# Patient Record
Sex: FEMALE
[De-identification: names, ages, dates, MRNs, and addresses within clinical notes are randomized per-mention and may not be internally consistent; named-entity substitution may affect disease eponyms.]

---

## 2022-07-06 LAB
HCT VFR BLD CALC: 42.5 % (ref 36–45)
LYMPHOCYTES # SPEC AUTO: 2.3 K/UL (ref 0.7–4.9)
MCV RBC: 99 FL (ref 80–100)
PMV BLD: 8.4 FL (ref 7.6–11.3)
RBC # BLD: 4.29 M/UL (ref 3.86–4.86)

## 2022-07-19 ENCOUNTER — HOSPITAL ENCOUNTER (OUTPATIENT)
Dept: HOSPITAL 97 - OR | Age: 52
Discharge: HOME | End: 2022-07-19
Attending: OBSTETRICS & GYNECOLOGY
Payer: COMMERCIAL

## 2022-07-19 VITALS — DIASTOLIC BLOOD PRESSURE: 75 MMHG | OXYGEN SATURATION: 99 % | SYSTOLIC BLOOD PRESSURE: 130 MMHG | TEMPERATURE: 96.2 F

## 2022-07-19 DIAGNOSIS — R10.2: ICD-10-CM

## 2022-07-19 DIAGNOSIS — N39.3: ICD-10-CM

## 2022-07-19 DIAGNOSIS — N83.12: Primary | ICD-10-CM

## 2022-07-19 DIAGNOSIS — Z20.822: ICD-10-CM

## 2022-07-19 PROCEDURE — 36415 COLL VENOUS BLD VENIPUNCTURE: CPT

## 2022-07-19 PROCEDURE — 86901 BLOOD TYPING SEROLOGIC RH(D): CPT

## 2022-07-19 PROCEDURE — 58661 LAPAROSCOPY REMOVE ADNEXA: CPT

## 2022-07-19 PROCEDURE — 0TVD8ZZ RESTRICTION OF URETHRA, VIA NATURAL OR ARTIFICIAL OPENING ENDOSCOPIC: ICD-10-PCS

## 2022-07-19 PROCEDURE — 51715 ENDOSCOPIC INJECTION/IMPLANT: CPT

## 2022-07-19 PROCEDURE — 81003 URINALYSIS AUTO W/O SCOPE: CPT

## 2022-07-19 PROCEDURE — 58662 LAPAROSCOPY EXCISE LESIONS: CPT

## 2022-07-19 PROCEDURE — 87811 SARS-COV-2 COVID19 W/OPTIC: CPT

## 2022-07-19 PROCEDURE — 85025 COMPLETE CBC W/AUTO DIFF WBC: CPT

## 2022-07-19 PROCEDURE — 0DBW4ZZ EXCISION OF PERITONEUM, PERCUTANEOUS ENDOSCOPIC APPROACH: ICD-10-PCS

## 2022-07-19 PROCEDURE — 0UT24ZZ RESECTION OF BILATERAL OVARIES, PERCUTANEOUS ENDOSCOPIC APPROACH: ICD-10-PCS

## 2022-07-19 PROCEDURE — 86900 BLOOD TYPING SEROLOGIC ABO: CPT

## 2022-07-19 PROCEDURE — 88305 TISSUE EXAM BY PATHOLOGIST: CPT

## 2022-07-19 PROCEDURE — 86850 RBC ANTIBODY SCREEN: CPT

## 2022-07-19 RX ADMIN — BUPIVACAINE HYDROCHLORIDE ONE ML: 2.5 INJECTION, SOLUTION EPIDURAL; INFILTRATION; INTRACAUDAL at 08:17

## 2022-07-19 RX ADMIN — HYDROMORPHONE HYDROCHLORIDE ONE MG: 1 INJECTION, SOLUTION INTRAMUSCULAR; INTRAVENOUS; SUBCUTANEOUS at 09:51

## 2022-07-19 RX ADMIN — HYDROMORPHONE HYDROCHLORIDE ONE MG: 1 INJECTION, SOLUTION INTRAMUSCULAR; INTRAVENOUS; SUBCUTANEOUS at 09:46

## 2022-07-19 RX ADMIN — BUPIVACAINE HYDROCHLORIDE ONE ML: 2.5 INJECTION, SOLUTION EPIDURAL; INFILTRATION; INTRACAUDAL at 07:46

## 2022-07-19 RX ADMIN — SODIUM CHLORIDE, SODIUM LACTATE, POTASSIUM CHLORIDE, AND CALCIUM CHLORIDE ONE MLS: .6; .31; .03; .02 INJECTION, SOLUTION INTRAVENOUS at 09:00

## 2022-07-19 RX ADMIN — SODIUM CHLORIDE, SODIUM LACTATE, POTASSIUM CHLORIDE, AND CALCIUM CHLORIDE ONE MLS: .6; .31; .03; .02 INJECTION, SOLUTION INTRAVENOUS at 08:57

## 2023-05-04 NOTE — OP
Date of Procedure:  07/19/2022



Surgeon:  Gem Chavez MD



Assistant:  Meena Leal.



Preoperative Diagnoses:  Pelvic pain and stress urinary incontinence.



Postoperative Diagnoses:  Pelvic pain and stress urinary incontinence, lesion suspicious for endometr
iosis, right pelvic peritoneum.



Procedures Performed:  Diagnostic laparoscopy, bilateral oophorectomy, then fulguration of peritoneal
 lesions on the right pelvic sidewall suspicious for endometriosis, urethral bulking with Bulkamid, a
nd cystoscopy.



Estimated Blood Loss:  Minimal.



Specimens:  Bilateral ovaries.



Complications:  No complications.



Drains:  No drains.



Condition:  The patient's condition is stable.



Findings:  The left ovary had cystic lesion on it, most likely a physiologic cyst.  No other lesions 
seen around.  On the right side, the ovary was completely unremarkable at the ovarian pedicle.  Tubes
 were not seen on either side.  They have been likely removed with the hysterectomy. 



There were peritoneal implants and calcifications along the right pelvic sidewall more than on the le
ft side and with suspicion that some of these could be endometriosis, fulguration with bipolar was do
ne.  On the left side, very few calcifications, but these were clearly postoperative changes. 



Appendix was visualized and unremarkable. 



Cystoscopy with bulking was performed with 1 mL of the Bulkamid gel. 



After informed consent was verified, the patient was taken back to the OR, placed in supine fashion o
n the operating table.  General anesthesia was given.  She was consented for a right oophorectomy as 
her pain was on the right side.  Also discussed that there is possibility that if there is no other c
ontributing factors, maybe it is best at age 52 to have a bilateral salpingo-oophorectomy, which woul
d be protective for no other ovarian lesions, but also for __________ from pain.  Once this was discu
ssed and the patient consented for this, then she was taken back.



Procedure In Detail:  She was placed in supine fashion on the operating table.  General anesthesia wa
s given.  She was placed in dorsal lithotomy position using Onesimo stirrups.  Abdomen, vulva, vagina, 
perineum, and medial thighs were prepped and draped in a sterile fashion. 



Sponge on stick was placed for vaginal retraction and a Stallworth was placed for drainage. 



1 cm infraumbilical incision made with a scalpel using the open laparoscopy technique.  Fascia was in
cised, tagged with 0 Vicryl sutures.  Peritoneum entered sharply.  S-retractors were placed, Saul i
ntroduced, and site of entry was checked and was unremarkable on upper abdominal surfaces, lower abdo
jayden surfaces.  On the anterior abdominal peritoneum, no lesions were seen. 



The 5 suprapubic and left lower quadrant ports were placed after injecting with local at fascia and s
kin.  Without any problems, then thorough inspection of the pelvic cavity was conducted and refer to 
the findings above.  The right ovary was nicely isolated on its pedicle, mostly hanging down towards 
the abdominal side.  This was picked up and the area round it was inspected for any endometriosis.  T
here were no suspicious lesions.  The right ovary was taken down with the help of LigaSure from the o
varian pedicle.  The specimen was placed in the cul-de-sac. 



I turned to the left side and the ovary was  from the lateral wall and isolated on the pedic
le.  Ureters were identified on both sides and were not close to this area.  Then, LigaSure was used 
to take down the IP ligament and ovary, had specimen placed in the cul-de-sac. 



Then, the peritoneal implants were picked up with the help of a grasper and cauterized with bipolar u
sing the LigaSure current without vessel sealing.  All on the right side from the round ligament area
 between the ureter and the bladder, this area was cauterized as well, making sure that no underlying
 bladder or ureter were present here, at least 5-6 places were fulgurated. 



No evidence of any other peritoneal implants in the posterior cul-de-sac or the bladder.  The left si
de was not clear for endometriosis. 



Thorough irrigation suction was performed.  The specimen placed in EndoCatch bag.  All trocars were r
emoved under direct vision and the Saul removed after desufflation.  The specimen bag removed witho
ut any problems.  Then, fascia closed with the help of 0 Vicryl sutures that were tied together.  All
 skin incisions closed with interrupted 4-0 chromic and Dermabond placed. 



A Stallworth was removed.  Vaginal sponge was removed.  Then, cystoscopy was performed with the pediatric 
0-degree cystoscope.  Bladder was then drained, made sure that inflow was just more than a good dribb
le and then the outflow was closed.  I then primed the Bulkamid syringe and needle and then introduce
d it through the introducer sheath.  The tip of the scope was taken to the level of the internal meat
us.  Then, the needle was extended 2 cm __________ and the entire system was brought in 2 cm to the m
id urethral area.  Then, the needle was injected in the submucosal plane to deploy 0.2 at 4 o'clock a
nd another 0.2 at 2 o'clock and then 0.3 at 11 and 8.  Once all these injections were done, there was
 a __________.  A soft 12-J[place] red rubber catheter was introduced just to mold the urethra around t
his and once this was done, the case was completed.  Toradol was given.  The patient was recovered fr
om anesthesia and taken to PACU in a stable condition.  She will follow up with us, after successful 
voiding trial today, in a week for postop.  If she is not able to void well today, then she may go ho
me with the catheter for 24 hours.





SK/AFSANEH

DD:  07/19/2022 10:03:49Voice ID:  [id number]

DT:  07/19/2022 20:58:23Report ID:  [id number]
Patient with one or more new problems requiring additional work-up/treatment.